# Patient Record
Sex: MALE | Race: BLACK OR AFRICAN AMERICAN | URBAN - METROPOLITAN AREA
[De-identification: names, ages, dates, MRNs, and addresses within clinical notes are randomized per-mention and may not be internally consistent; named-entity substitution may affect disease eponyms.]

---

## 2020-11-22 NOTE — PATIENT DISCUSSION
Despite some risk factors, the patient does not demonstrate definitive evidence of glaucoma at this time.
Discussed condition and exacerbating conditions/situations (e.g., dry/arid environments, overhead fans, air conditioners, side effect of medications).
Monitor.
No Retinal holes, Tears or Detachments.
Patient understands condition, prognosis and need for follow up care.
Recommended artificial tears and warm compresses.
Retinal tear and detachment warning symptoms reviewed and patient instructed to call immediately if increasing floaters, flashes, or decreasing peripheral vision.
Stable.
The IOP is in the target range.
49.9

## 2021-07-29 ENCOUNTER — IMPORTED ENCOUNTER (OUTPATIENT)
Dept: URBAN - METROPOLITAN AREA CLINIC 9 | Facility: CLINIC | Age: 61
End: 2021-07-29

## 2021-07-29 PROBLEM — E11.9: Noted: 2021-07-29

## 2021-07-29 PROBLEM — H52.223: Noted: 2021-07-29

## 2021-07-29 PROBLEM — H25.13: Noted: 2021-07-29

## 2021-07-29 PROBLEM — H25.12: Noted: 2021-07-29

## 2021-07-29 PROBLEM — H04.123: Noted: 2021-07-29

## 2021-07-29 PROBLEM — Z98.42: Noted: 2022-07-26

## 2021-07-29 PROBLEM — H52.03: Noted: 2021-07-29

## 2021-07-29 PROBLEM — Z98.41: Noted: 2022-06-28

## 2021-07-29 PROBLEM — H40.013: Noted: 2021-07-29

## 2021-07-29 PROBLEM — Z96.1: Noted: 2022-07-26

## 2021-10-16 ASSESSMENT — KERATOMETRY
OS_K1POWER_DIOPTERS: 40.75
OS_AXISANGLE2_DEGREES: 180
OD_AXISANGLE2_DEGREES: 99
OD_AXISANGLE_DEGREES: 9
OD_K1POWER_DIOPTERS: 42.25
OS_AXISANGLE_DEGREES: 90
OS_K2POWER_DIOPTERS: 42
OD_K2POWER_DIOPTERS: 42.75

## 2021-10-16 ASSESSMENT — VISUAL ACUITY
OS_SC: CF 2FT SN
OS_CC: 20/30 -2 SN
OD_SC: CF 2FT SN
OD_CC: 20/60 - SN

## 2021-10-16 ASSESSMENT — TONOMETRY
OS_IOP_MMHG: 16
OD_IOP_MMHG: 18

## 2022-03-30 ENCOUNTER — PREPPED CHART (OUTPATIENT)
Dept: URBAN - METROPOLITAN AREA CLINIC 9 | Facility: CLINIC | Age: 62
End: 2022-03-30

## 2022-05-23 ENCOUNTER — ESTABLISHED PATIENT (OUTPATIENT)
Dept: URBAN - METROPOLITAN AREA CLINIC 9 | Facility: CLINIC | Age: 62
End: 2022-05-23

## 2022-05-23 DIAGNOSIS — H25.13: ICD-10-CM

## 2022-05-23 DIAGNOSIS — H04.123: ICD-10-CM

## 2022-05-23 DIAGNOSIS — E11.9: ICD-10-CM

## 2022-05-23 DIAGNOSIS — H40.053: ICD-10-CM

## 2022-05-23 DIAGNOSIS — H52.223: ICD-10-CM

## 2022-05-23 PROCEDURE — 92015 DETERMINE REFRACTIVE STATE: CPT

## 2022-05-23 PROCEDURE — 92014 COMPRE OPH EXAM EST PT 1/>: CPT

## 2022-05-23 ASSESSMENT — VISUAL ACUITY
OU_SC: 20/60
OS_GLARE: 20/100
OS_SC: 20/60
OD_SC: 20/100-1

## 2022-05-23 ASSESSMENT — KERATOMETRY
OS_K2POWER_DIOPTERS: 41.75
OS_AXISANGLE_DEGREES: 79
OS_AXISANGLE2_DEGREES: 169
OD_AXISANGLE_DEGREES: 93
OD_K1POWER_DIOPTERS: 41.75
OS_K1POWER_DIOPTERS: 41.25
OD_K2POWER_DIOPTERS: 42.25
OD_AXISANGLE2_DEGREES: 3

## 2022-05-23 ASSESSMENT — TONOMETRY
OD_IOP_MMHG: 16
OS_IOP_MMHG: 24

## 2022-06-01 ENCOUNTER — PRE-OP/H&P (OUTPATIENT)
Dept: URBAN - METROPOLITAN AREA CLINIC 9 | Facility: CLINIC | Age: 62
End: 2022-06-01

## 2022-06-01 DIAGNOSIS — H25.13: ICD-10-CM

## 2022-06-01 PROCEDURE — 99211PRE PRE OP VISIT

## 2022-06-01 PROCEDURE — 92136 OPHTHALMIC BIOMETRY: CPT

## 2022-06-01 ASSESSMENT — KERATOMETRY
OD_AXISANGLE2_DEGREES: 156
OD_K2POWER_DIOPTERS: 42.25
OS_AXISANGLE_DEGREES: 105
OD_K1POWER_DIOPTERS: 41.50
OS_K2POWER_DIOPTERS: 42.00
OS_K1POWER_DIOPTERS: 41.25
OS_AXISANGLE2_DEGREES: 15
OD_AXISANGLE_DEGREES: 66

## 2022-06-01 ASSESSMENT — VISUAL ACUITY
OD_SC: 20/400
OS_SC: 20/200+1
OU_SC: 20/100+1

## 2022-06-14 ENCOUNTER — ESTABLISHED PATIENT (OUTPATIENT)
Dept: URBAN - METROPOLITAN AREA CLINIC 9 | Facility: CLINIC | Age: 62
End: 2022-06-14

## 2022-06-14 DIAGNOSIS — H04.123: ICD-10-CM

## 2022-06-14 DIAGNOSIS — H11.421: ICD-10-CM

## 2022-06-14 PROCEDURE — 92012 INTRM OPH EXAM EST PATIENT: CPT

## 2022-06-14 ASSESSMENT — TONOMETRY
OD_IOP_MMHG: 10
OS_IOP_MMHG: 11

## 2022-06-14 ASSESSMENT — KERATOMETRY
OD_K1POWER_DIOPTERS: 41.50
OD_AXISANGLE2_DEGREES: 156
OS_AXISANGLE_DEGREES: 105
OS_K1POWER_DIOPTERS: 41.25
OD_K2POWER_DIOPTERS: 42.25
OD_AXISANGLE_DEGREES: 66
OS_K2POWER_DIOPTERS: 42.00
OS_AXISANGLE2_DEGREES: 15

## 2022-06-14 ASSESSMENT — VISUAL ACUITY
OD_SC: 20/60+1
OS_SC: 20/50
OD_PH: 20/40
OS_PH: 20/40-2

## 2022-06-21 ENCOUNTER — ESTABLISHED PATIENT (OUTPATIENT)
Dept: URBAN - METROPOLITAN AREA CLINIC 9 | Facility: CLINIC | Age: 62
End: 2022-06-21

## 2022-06-21 DIAGNOSIS — H04.123: ICD-10-CM

## 2022-06-21 PROCEDURE — 99213 OFFICE O/P EST LOW 20 MIN: CPT

## 2022-06-21 ASSESSMENT — KERATOMETRY
OD_K1POWER_DIOPTERS: 41.50
OD_AXISANGLE2_DEGREES: 156
OS_AXISANGLE_DEGREES: 105
OS_K2POWER_DIOPTERS: 42.00
OD_K2POWER_DIOPTERS: 42.25
OS_AXISANGLE2_DEGREES: 15
OD_AXISANGLE_DEGREES: 66
OS_K1POWER_DIOPTERS: 41.25

## 2022-06-21 ASSESSMENT — TONOMETRY
OS_IOP_MMHG: 12
OD_IOP_MMHG: 11

## 2022-06-21 ASSESSMENT — VISUAL ACUITY
OD_SC: 20/60
OS_PH: 20/40
OD_PH: 20/40
OU_SC: 20/50
OS_SC: 20/50

## 2022-06-27 RX ORDER — GABAPENTIN 300 MG/1
1 CAPSULE ORAL
COMMUNITY

## 2022-06-29 ENCOUNTER — POST-OP (OUTPATIENT)
Dept: URBAN - METROPOLITAN AREA CLINIC 9 | Facility: CLINIC | Age: 62
End: 2022-06-29

## 2022-06-29 DIAGNOSIS — Z96.1: ICD-10-CM

## 2022-06-29 PROCEDURE — 99024 POSTOP FOLLOW-UP VISIT: CPT

## 2022-06-29 ASSESSMENT — KERATOMETRY
OS_AXISANGLE_DEGREES: 105
OD_K2POWER_DIOPTERS: 42.25
OD_AXISANGLE2_DEGREES: 156
OS_K2POWER_DIOPTERS: 42.00
OS_K1POWER_DIOPTERS: 41.25
OD_AXISANGLE_DEGREES: 66
OS_AXISANGLE2_DEGREES: 15
OD_K1POWER_DIOPTERS: 41.50

## 2022-06-29 ASSESSMENT — VISUAL ACUITY
OU_SC: 20/40
OD_SC: 20/25-2

## 2022-06-29 ASSESSMENT — TONOMETRY: OD_IOP_MMHG: 12

## 2022-07-07 ENCOUNTER — POST OP/EVAL OF SECOND EYE (OUTPATIENT)
Dept: URBAN - METROPOLITAN AREA CLINIC 9 | Facility: CLINIC | Age: 62
End: 2022-07-07

## 2022-07-07 DIAGNOSIS — H25.12: ICD-10-CM

## 2022-07-07 DIAGNOSIS — Z96.1: ICD-10-CM

## 2022-07-07 PROCEDURE — 99024 POSTOP FOLLOW-UP VISIT: CPT

## 2022-07-07 PROCEDURE — 92136 OPHTHALMIC BIOMETRY: CPT

## 2022-07-07 ASSESSMENT — VISUAL ACUITY
OS_SC: 20/60
OD_SC: 20/20-1
OS_PH: 20/50-1
OU_SC: 20/25-1

## 2022-07-07 ASSESSMENT — TONOMETRY: OD_IOP_MMHG: 12

## 2022-07-26 PROBLEM — Z98.41: Noted: 2022-06-28

## 2022-07-26 PROBLEM — Z98.42: Noted: 2022-07-26

## 2022-07-26 PROBLEM — H52.03: Noted: 2021-07-29

## 2022-07-26 PROBLEM — H52.223: Noted: 2021-07-29

## 2022-07-26 PROBLEM — H04.123: Noted: 2021-07-29

## 2022-07-27 ENCOUNTER — POST-OP (OUTPATIENT)
Dept: URBAN - METROPOLITAN AREA CLINIC 9 | Facility: CLINIC | Age: 62
End: 2022-07-27

## 2022-07-27 DIAGNOSIS — Z96.1: ICD-10-CM

## 2022-07-27 PROCEDURE — 99024 POSTOP FOLLOW-UP VISIT: CPT

## 2022-07-27 ASSESSMENT — TONOMETRY: OS_IOP_MMHG: 18

## 2022-07-27 ASSESSMENT — VISUAL ACUITY
OS_SC: 20/50
OD_SC: 20/20-1
OU_SC: 20/20-1

## 2022-08-11 ENCOUNTER — POST-OP (OUTPATIENT)
Dept: URBAN - METROPOLITAN AREA CLINIC 9 | Facility: CLINIC | Age: 62
End: 2022-08-11

## 2022-08-11 DIAGNOSIS — Z96.1: ICD-10-CM

## 2022-08-11 PROCEDURE — 99024 POSTOP FOLLOW-UP VISIT: CPT

## 2022-08-11 ASSESSMENT — KERATOMETRY
OS_K2POWER_DIOPTERS: 42.00
OD_K1POWER_DIOPTERS: 41.75
OS_AXISANGLE2_DEGREES: 94
OD_K2POWER_DIOPTERS: 42.50
OD_AXISANGLE_DEGREES: 85
OS_K1POWER_DIOPTERS: 41.75
OS_AXISANGLE_DEGREES: 4
OD_AXISANGLE2_DEGREES: 175

## 2022-08-11 ASSESSMENT — VISUAL ACUITY
OU_SC: 20/20-1
OS_SC: 20/40
OD_SC: 20/20

## 2022-08-11 ASSESSMENT — TONOMETRY
OD_IOP_MMHG: 10
OS_IOP_MMHG: 12

## 2023-01-04 ENCOUNTER — PREPPED CHART (OUTPATIENT)
Dept: URBAN - METROPOLITAN AREA CLINIC 9 | Facility: CLINIC | Age: 63
End: 2023-01-04

## 2024-03-25 ENCOUNTER — ESTABLISHED PATIENT (OUTPATIENT)
Facility: LOCATION | Age: 64
End: 2024-03-25

## 2024-03-25 DIAGNOSIS — H04.123: ICD-10-CM

## 2024-03-25 DIAGNOSIS — H52.223: ICD-10-CM

## 2024-03-25 DIAGNOSIS — E11.3293: ICD-10-CM

## 2024-03-25 DIAGNOSIS — H40.053: ICD-10-CM

## 2024-03-25 DIAGNOSIS — H26.493: ICD-10-CM

## 2024-03-25 PROCEDURE — 92134 CPTRZ OPH DX IMG PST SGM RTA: CPT

## 2024-03-25 PROCEDURE — 92015 DETERMINE REFRACTIVE STATE: CPT

## 2024-03-25 PROCEDURE — 92014 COMPRE OPH EXAM EST PT 1/>: CPT

## 2024-03-25 ASSESSMENT — KERATOMETRY
OS_K1POWER_DIOPTERS: 41.50
OD_K1POWER_DIOPTERS: 41.75
OD_AXISANGLE2_DEGREES: 115
OS_AXISANGLE_DEGREES: 4
OS_AXISANGLE2_DEGREES: 94
OD_K2POWER_DIOPTERS: 42.25
OS_K2POWER_DIOPTERS: 42.25
OD_AXISANGLE_DEGREES: 25

## 2024-03-25 ASSESSMENT — TONOMETRY
OD_IOP_MMHG: 12
OS_IOP_MMHG: 15

## 2024-03-25 ASSESSMENT — VISUAL ACUITY
OS_SC: 20/20
OU_SC: 20/20
OD_SC: 20/20

## 2024-04-26 ENCOUNTER — PREPPED CHART (OUTPATIENT)
Dept: URBAN - METROPOLITAN AREA CLINIC 11 | Facility: CLINIC | Age: 64
End: 2024-04-26

## 2024-04-26 ASSESSMENT — KERATOMETRY
OS_K1POWER_DIOPTERS: 41.50
OS_K2POWER_DIOPTERS: 42.25
OD_AXISANGLE2_DEGREES: 115
OD_K1POWER_DIOPTERS: 41.75
OS_AXISANGLE_DEGREES: 4
OS_AXISANGLE2_DEGREES: 94
OD_K2POWER_DIOPTERS: 42.25
OD_AXISANGLE_DEGREES: 25

## 2025-03-25 ENCOUNTER — APPOINTMENT (OUTPATIENT)
Dept: URBAN - METROPOLITAN AREA CLINIC 21 | Facility: CLINIC | Age: 65
Setting detail: DERMATOLOGY
End: 2025-03-25

## 2025-03-25 DIAGNOSIS — L72.0 EPIDERMAL CYST: ICD-10-CM

## 2025-03-25 DIAGNOSIS — Z71.89 OTHER SPECIFIED COUNSELING: ICD-10-CM

## 2025-03-25 PROCEDURE — ? COUNSELING

## 2025-03-25 PROCEDURE — ? INTRALESIONAL KENALOG

## 2025-03-25 PROCEDURE — 99202 OFFICE O/P NEW SF 15 MIN: CPT | Mod: 25

## 2025-03-25 PROCEDURE — 11900 INJECT SKIN LESIONS </W 7: CPT

## 2025-03-25 ASSESSMENT — LOCATION DETAILED DESCRIPTION DERM
LOCATION DETAILED: RIGHT SUPERIOR CENTRAL MALAR CHEEK
LOCATION DETAILED: RIGHT MEDIAL FOREHEAD

## 2025-03-25 ASSESSMENT — LOCATION ZONE DERM: LOCATION ZONE: FACE

## 2025-03-25 ASSESSMENT — LOCATION SIMPLE DESCRIPTION DERM
LOCATION SIMPLE: RIGHT CHEEK
LOCATION SIMPLE: RIGHT FOREHEAD

## 2025-03-25 NOTE — HPI: OTHER
Condition:: Cyst
Please Describe Your Condition:: is a new patient who is being seen for a chief complaint of Cyst. Patient states he’s had cyst for awhile located on the face. Patient states that it is intermittently painful and recently has gotten bigger. He states that it often fluctuates in size. Patient has never had cyst treated.

## 2025-03-25 NOTE — PROCEDURE: INTRALESIONAL KENALOG
Validate Note Data When Using Inventory: Yes
Kenalog Preparation: Kenalog
Total Volume (Ccs): 0.3
How Many Mls Were Removed From The 80 Mg/Ml (5ml) Vial When Preparing The Injectable Solution?: 0
Detail Level: Detailed
Medical Necessity Clause: This procedure was medically necessary because the lesions that were treated were:
Consent: The risks of atrophy were reviewed with the patient.
Lot # For Kenalog (Optional): [id number]
Expiration Date For Kenalog (Optional): 06/2027
Include Z78.9 (Other Specified Conditions Influencing Health Status) As An Associated Diagnosis?: No
Kenalog Type Of Vial: Multiple Dose
Concentration Of Kenalog Solution Injected (Mg/Ml): 2.5
Show Inventory Tab: Hide